# Patient Record
Sex: FEMALE | Race: WHITE | Employment: OTHER | URBAN - METROPOLITAN AREA
[De-identification: names, ages, dates, MRNs, and addresses within clinical notes are randomized per-mention and may not be internally consistent; named-entity substitution may affect disease eponyms.]

---

## 2017-01-01 ENCOUNTER — OFFICE VISIT (OUTPATIENT)
Dept: INTERNAL MEDICINE CLINIC | Age: 72
End: 2017-01-01

## 2017-01-01 ENCOUNTER — CARE COORDINATION (OUTPATIENT)
Dept: CARE COORDINATION | Age: 72
End: 2017-01-01

## 2017-01-01 ENCOUNTER — TELEPHONE (OUTPATIENT)
Dept: INTERNAL MEDICINE CLINIC | Age: 72
End: 2017-01-01

## 2017-01-01 VITALS — SYSTOLIC BLOOD PRESSURE: 110 MMHG | HEIGHT: 62 IN | DIASTOLIC BLOOD PRESSURE: 80 MMHG

## 2017-01-01 VITALS — DIASTOLIC BLOOD PRESSURE: 60 MMHG | OXYGEN SATURATION: 98 % | SYSTOLIC BLOOD PRESSURE: 128 MMHG | HEART RATE: 66 BPM

## 2017-01-01 VITALS — HEIGHT: 62 IN | SYSTOLIC BLOOD PRESSURE: 126 MMHG | TEMPERATURE: 99.3 F | DIASTOLIC BLOOD PRESSURE: 80 MMHG

## 2017-01-01 DIAGNOSIS — I50.22 CHRONIC SYSTOLIC CONGESTIVE HEART FAILURE (HCC): ICD-10-CM

## 2017-01-01 DIAGNOSIS — K22.719 BARRETT'S ESOPHAGUS WITH DYSPLASIA: ICD-10-CM

## 2017-01-01 DIAGNOSIS — E11.40 TYPE 2 DIABETES MELLITUS WITH DIABETIC NEUROPATHY, WITHOUT LONG-TERM CURRENT USE OF INSULIN (HCC): ICD-10-CM

## 2017-01-01 DIAGNOSIS — N18.6 ESRD (END STAGE RENAL DISEASE) (HCC): ICD-10-CM

## 2017-01-01 DIAGNOSIS — I63.9 CEREBROVASCULAR ACCIDENT (CVA), UNSPECIFIED MECHANISM (HCC): Primary | ICD-10-CM

## 2017-01-01 DIAGNOSIS — E11.40 TYPE 2 DIABETES MELLITUS WITH DIABETIC NEUROPATHY, WITHOUT LONG-TERM CURRENT USE OF INSULIN (HCC): Primary | ICD-10-CM

## 2017-01-01 DIAGNOSIS — N30.00 ACUTE CYSTITIS WITHOUT HEMATURIA: ICD-10-CM

## 2017-01-01 DIAGNOSIS — G62.9 NEUROPATHY: ICD-10-CM

## 2017-01-01 DIAGNOSIS — Z74.09 LIMITED MOBILITY: ICD-10-CM

## 2017-01-01 DIAGNOSIS — S42.294S OTHER CLOSED NONDISPLACED FRACTURE OF PROXIMAL END OF RIGHT HUMERUS, SEQUELA: ICD-10-CM

## 2017-01-01 DIAGNOSIS — I63.9 CEREBROVASCULAR ACCIDENT (CVA), UNSPECIFIED MECHANISM (HCC): ICD-10-CM

## 2017-01-01 DIAGNOSIS — B37.9 YEAST INFECTION: ICD-10-CM

## 2017-01-01 DIAGNOSIS — M19.90 OSTEOARTHRITIS, UNSPECIFIED OSTEOARTHRITIS TYPE, UNSPECIFIED SITE: ICD-10-CM

## 2017-01-01 DIAGNOSIS — I10 ESSENTIAL HYPERTENSION: ICD-10-CM

## 2017-01-01 DIAGNOSIS — Z09 HOSPITAL DISCHARGE FOLLOW-UP: Primary | ICD-10-CM

## 2017-01-01 DIAGNOSIS — M53.3 COCCYX PAIN: ICD-10-CM

## 2017-01-01 LAB
AVERAGE GLUCOSE: NORMAL
HBA1C MFR BLD: 5.8 %

## 2017-01-01 PROCEDURE — 99214 OFFICE O/P EST MOD 30 MIN: CPT | Performed by: NURSE PRACTITIONER

## 2017-01-01 PROCEDURE — G8599 NO ASA/ANTIPLAT THER USE RNG: HCPCS | Performed by: NURSE PRACTITIONER

## 2017-01-01 PROCEDURE — 1090F PRES/ABSN URINE INCON ASSESS: CPT | Performed by: NURSE PRACTITIONER

## 2017-01-01 PROCEDURE — G8427 DOCREV CUR MEDS BY ELIG CLIN: HCPCS | Performed by: INTERNAL MEDICINE

## 2017-01-01 PROCEDURE — 3046F HEMOGLOBIN A1C LEVEL >9.0%: CPT | Performed by: NURSE PRACTITIONER

## 2017-01-01 PROCEDURE — 1036F TOBACCO NON-USER: CPT | Performed by: NURSE PRACTITIONER

## 2017-01-01 PROCEDURE — 3017F COLORECTAL CA SCREEN DOC REV: CPT | Performed by: NURSE PRACTITIONER

## 2017-01-01 PROCEDURE — 3046F HEMOGLOBIN A1C LEVEL >9.0%: CPT | Performed by: INTERNAL MEDICINE

## 2017-01-01 PROCEDURE — G8427 DOCREV CUR MEDS BY ELIG CLIN: HCPCS | Performed by: NURSE PRACTITIONER

## 2017-01-01 PROCEDURE — 3014F SCREEN MAMMO DOC REV: CPT | Performed by: INTERNAL MEDICINE

## 2017-01-01 PROCEDURE — G8599 NO ASA/ANTIPLAT THER USE RNG: HCPCS | Performed by: INTERNAL MEDICINE

## 2017-01-01 PROCEDURE — G8400 PT W/DXA NO RESULTS DOC: HCPCS | Performed by: NURSE PRACTITIONER

## 2017-01-01 PROCEDURE — 1036F TOBACCO NON-USER: CPT | Performed by: INTERNAL MEDICINE

## 2017-01-01 PROCEDURE — G8417 CALC BMI ABV UP PARAM F/U: HCPCS | Performed by: NURSE PRACTITIONER

## 2017-01-01 PROCEDURE — 3017F COLORECTAL CA SCREEN DOC REV: CPT | Performed by: INTERNAL MEDICINE

## 2017-01-01 PROCEDURE — 4040F PNEUMOC VAC/ADMIN/RCVD: CPT | Performed by: NURSE PRACTITIONER

## 2017-01-01 PROCEDURE — G8484 FLU IMMUNIZE NO ADMIN: HCPCS | Performed by: INTERNAL MEDICINE

## 2017-01-01 PROCEDURE — G8400 PT W/DXA NO RESULTS DOC: HCPCS | Performed by: INTERNAL MEDICINE

## 2017-01-01 PROCEDURE — 4040F PNEUMOC VAC/ADMIN/RCVD: CPT | Performed by: INTERNAL MEDICINE

## 2017-01-01 PROCEDURE — 1123F ACP DISCUSS/DSCN MKR DOCD: CPT | Performed by: NURSE PRACTITIONER

## 2017-01-01 PROCEDURE — 1090F PRES/ABSN URINE INCON ASSESS: CPT | Performed by: INTERNAL MEDICINE

## 2017-01-01 PROCEDURE — 1123F ACP DISCUSS/DSCN MKR DOCD: CPT | Performed by: INTERNAL MEDICINE

## 2017-01-01 PROCEDURE — 99214 OFFICE O/P EST MOD 30 MIN: CPT | Performed by: INTERNAL MEDICINE

## 2017-01-01 PROCEDURE — 3014F SCREEN MAMMO DOC REV: CPT | Performed by: NURSE PRACTITIONER

## 2017-01-01 PROCEDURE — G8417 CALC BMI ABV UP PARAM F/U: HCPCS | Performed by: INTERNAL MEDICINE

## 2017-01-01 RX ORDER — ALBUTEROL SULFATE 90 UG/1
AEROSOL, METERED RESPIRATORY (INHALATION)
Qty: 3 INHALER | Refills: 3 | Status: SHIPPED | OUTPATIENT
Start: 2017-01-01

## 2017-01-01 RX ORDER — GABAPENTIN 300 MG/1
CAPSULE ORAL
Qty: 180 CAPSULE | Refills: 3 | Status: SHIPPED | OUTPATIENT
Start: 2017-01-01

## 2017-01-01 RX ORDER — CEPHALEXIN 500 MG/1
500 CAPSULE ORAL 3 TIMES DAILY
Qty: 21 CAPSULE | Refills: 0 | Status: ON HOLD | OUTPATIENT
Start: 2017-01-01 | End: 2018-01-01

## 2017-01-01 RX ORDER — SERTRALINE HYDROCHLORIDE 100 MG/1
TABLET, FILM COATED ORAL
Qty: 135 TABLET | Refills: 2 | Status: SHIPPED | OUTPATIENT
Start: 2017-01-01

## 2017-01-01 RX ORDER — GLIMEPIRIDE 1 MG/1
TABLET ORAL
Qty: 45 TABLET | Refills: 3 | Status: SHIPPED | OUTPATIENT
Start: 2017-01-01

## 2017-01-01 RX ORDER — CLOTRIMAZOLE 1 %
CREAM (GRAM) TOPICAL
Qty: 1 TUBE | Refills: 1 | Status: SHIPPED | OUTPATIENT
Start: 2017-01-01 | End: 2017-01-01

## 2017-01-01 RX ORDER — PANTOPRAZOLE SODIUM 40 MG/1
40 TABLET, DELAYED RELEASE ORAL DAILY
Qty: 90 TABLET | Refills: 1 | Status: SHIPPED | OUTPATIENT
Start: 2017-01-01

## 2017-01-01 RX ORDER — GABAPENTIN 300 MG/1
CAPSULE ORAL
Qty: 180 CAPSULE | Refills: 1 | Status: SHIPPED | OUTPATIENT
Start: 2017-01-01 | End: 2017-01-01 | Stop reason: SDUPTHER

## 2017-01-01 RX ORDER — PRAVASTATIN SODIUM 40 MG
TABLET ORAL
Qty: 90 TABLET | Refills: 1 | Status: SHIPPED | OUTPATIENT
Start: 2017-01-01 | End: 2017-01-01 | Stop reason: SDUPTHER

## 2017-01-01 RX ORDER — CEPHALEXIN 500 MG/1
500 CAPSULE ORAL 2 TIMES DAILY
Qty: 20 CAPSULE | Refills: 0 | Status: SHIPPED | OUTPATIENT
Start: 2017-01-01 | End: 2017-01-01

## 2017-01-01 RX ORDER — CUSHION
1 EACH MISCELLANEOUS DAILY
Qty: 1 EACH | Refills: 0 | Status: SHIPPED | OUTPATIENT
Start: 2017-01-01

## 2017-01-01 RX ORDER — CIPROFLOXACIN 500 MG/1
500 TABLET, FILM COATED ORAL 2 TIMES DAILY
Qty: 10 TABLET | Refills: 0 | Status: ON HOLD | OUTPATIENT
Start: 2017-01-01 | End: 2018-01-01 | Stop reason: ALTCHOICE

## 2017-01-01 RX ORDER — PRAVASTATIN SODIUM 40 MG
TABLET ORAL
Qty: 90 TABLET | Refills: 3 | Status: SHIPPED | OUTPATIENT
Start: 2017-01-01

## 2017-01-01 ASSESSMENT — ENCOUNTER SYMPTOMS
COUGH: 0
BLOOD IN STOOL: 0
CONSTIPATION: 0
COLOR CHANGE: 0
COLOR CHANGE: 0
BLOOD IN STOOL: 0
COUGH: 0
CONSTIPATION: 0
ABDOMINAL PAIN: 0
SHORTNESS OF BREATH: 0
ABDOMINAL PAIN: 0
SHORTNESS OF BREATH: 0

## 2017-01-03 ENCOUNTER — CARE COORDINATION (OUTPATIENT)
Dept: CARE COORDINATION | Age: 72
End: 2017-01-03

## 2017-01-06 ENCOUNTER — OFFICE VISIT (OUTPATIENT)
Dept: INTERNAL MEDICINE CLINIC | Age: 72
End: 2017-01-06

## 2017-01-06 VITALS — DIASTOLIC BLOOD PRESSURE: 50 MMHG | SYSTOLIC BLOOD PRESSURE: 100 MMHG | HEART RATE: 79 BPM

## 2017-01-06 DIAGNOSIS — D64.9 ANEMIA, UNSPECIFIED TYPE: ICD-10-CM

## 2017-01-06 DIAGNOSIS — I10 ESSENTIAL HYPERTENSION: ICD-10-CM

## 2017-01-06 DIAGNOSIS — I50.22 CHRONIC SYSTOLIC CONGESTIVE HEART FAILURE (HCC): ICD-10-CM

## 2017-01-06 DIAGNOSIS — E11.40 TYPE 2 DIABETES MELLITUS WITH DIABETIC NEUROPATHY, WITHOUT LONG-TERM CURRENT USE OF INSULIN (HCC): ICD-10-CM

## 2017-01-06 DIAGNOSIS — K92.2 GASTROINTESTINAL HEMORRHAGE, UNSPECIFIED GASTROINTESTINAL HEMORRHAGE TYPE: Primary | ICD-10-CM

## 2017-01-24 PROCEDURE — 99495 TRANSJ CARE MGMT MOD F2F 14D: CPT | Performed by: INTERNAL MEDICINE

## 2017-02-07 ENCOUNTER — HOSPITAL ENCOUNTER (OUTPATIENT)
Dept: NUCLEAR MEDICINE | Age: 72
Discharge: HOME | End: 2017-02-07
Attending: INTERNAL MEDICINE | Admitting: INTERNAL MEDICINE

## 2017-02-07 DIAGNOSIS — K92.2 HEMORRHAGE OF GASTROINTESTINAL TRACT, UNSPECIFIED: ICD-10-CM

## 2017-02-07 DIAGNOSIS — K92.2 GASTROINTESTINAL HEMORRHAGE: ICD-10-CM

## 2017-02-07 RX ADMIN — Medication 24.8 MILLICURIE: at 10:38

## 2017-05-25 PROBLEM — I63.9 CVA (CEREBRAL VASCULAR ACCIDENT) (HCC): Status: ACTIVE | Noted: 2017-01-01

## 2017-10-19 NOTE — PROGRESS NOTES
limbs, ambulates with a electric wheelchair, no slurred speech, no facial droop, good orientation. Good historian. Blood pressure 128/60, pulse 66, SpO2 98 %. Assessment:      1. Right leg strain/tendinitis: Acute  2. Right shoulder pain and discomfort: Chronic  3. DM 2  4. Hyperlipidemia  5. Osteoarthritis  6. End-stage renal disease on dialysis  7. Anemia      Plan:     1. Voltaren gel topical to right hamstring area may also be used about right shoulder  2. Patient declined orthopedic consultation  3. Gave slip to obtain fasting laboratory studies and call results  4. Return follow-up  Dr. Bob Quinteros

## 2017-11-08 NOTE — TELEPHONE ENCOUNTER
Barbara Scott calling [facility] [address] [facility] [facility] [facility] to advise Dandy Smith that the cushion for the wheel chair has not been approved as not being medically necessary. Is there documentation to the fact that she needs this cushion; I.e., ulcers, wound notes, pressure ulcers; anything that can get this cushion passed through the insurance. Please send electronically CVA with DX code     Fax # is [phone]    Khushi's phone # is:  [phone] - please advise if there will be any issues.

## 2017-11-14 NOTE — TELEPHONE ENCOUNTER
Refill request for GABAPENTIN, PRQAVASTATIN  medication.      Name of [facility] visit - 10/19/17     Pending visit - 1/25/18    Last refill -GABAPENTIN 6/19/17,  PRAVASTATIN 8/1/17      Medication Contract signed -  Last Oarrs ran-         Additional Comments

## 2017-12-07 NOTE — TELEPHONE ENCOUNTER
Refill request for PROAIR HFA INHALER medication.      Name of [facility] visit - 10/19/17     Pending visit - 1/25/18    Last refill -2/16/17      Medication Contract signed -   Last Oarrs ran-         Additional Comments

## 2017-12-29 NOTE — TELEPHONE ENCOUNTER
She has no way to get a urine sample taken to anywhere, and she cant pee that much anyway, she burns really bad when she does go and would like keflex sent to her pharmacy.

## 2017-12-29 NOTE — TELEPHONE ENCOUNTER
Patient said Dr. Han Pickering gave her Cipro for a UTI, ever since then she has had nausea, vomiting, chills and extreme burning when she urinates worse than before. Patient denies fever.           [phone]

## 2018-01-01 ENCOUNTER — TELEPHONE (OUTPATIENT)
Dept: INTERNAL MEDICINE CLINIC | Age: 73
End: 2018-01-01

## 2018-01-01 ENCOUNTER — HOSPITAL ENCOUNTER (OUTPATIENT)
Dept: GENERAL RADIOLOGY | Age: 73
Discharge: HOME | End: 2018-01-23
Admitting: INTERNAL MEDICINE

## 2018-01-01 ENCOUNTER — HOSPITAL ENCOUNTER (OUTPATIENT)
Dept: GENERAL RADIOLOGY | Age: 73
Discharge: HOME | End: 2018-02-09
Attending: INTERNAL MEDICINE | Admitting: INTERNAL MEDICINE

## 2018-01-01 DIAGNOSIS — R13.10 DYSPHAGIA, UNSPECIFIED TYPE: ICD-10-CM

## 2018-01-01 DIAGNOSIS — R13.10 DYSPHAGIA: ICD-10-CM

## 2018-01-23 NOTE — PROCEDURES
INSTRUMENTAL SWALLOW REPORT  MODIFIED BARIUM SWALLOW    NAME: Kamala Dillon   : 1945  MRN: [id number]       Date of Eval: 2018   Radiologist: Dr. Eleni Munoz  Ordering Physician: Dr. Celso Weller    Referring Diagnosis(es): Dysphagia     Past Medical History:  has a past medical history of Anxiety; Arthritis; Rae esophagus; CHF (congestive heart failure) ([facility] [address] Chronic back pain; Depression; Diabetes mellitus ([facility] [address] ESRD (end stage renal disease) ([facility] [address] GERD (gastroesophageal reflux disease); GI bleed; Hemodialysis patient [facility]); History of blood transfusion; Hyperlipidemia; Hypertension; Neuropathy ([facility] [address] Seizures ([facility] [address] Squamous cell carcinoma; Type II or unspecified type diabetes mellitus without mention of complication, not stated as uncontrolled; and Unspecified cerebral artery occlusion with cerebral infarction. Past Surgical History:  has a past surgical history that includes Appendectomy; Hysterectomy; Tonsillectomy; AV fistula repair;  section; Carpal tunnel release; eye surgery (LASER); toenail excision; Hand surgery (Bilateral, 2016); other surgical history (Right, 2016); Upper gastrointestinal endoscopy (); Upper gastrointestinal endoscopy (11/10/2016); Upper gastrointestinal endoscopy (2016); Colonoscopy (); Colonoscopy (2016); and Colonoscopy (2016). Recent CXR/CT of Chest: 17  LUNGS: No well-defined focal opacity. Basilar changes are seen similar to the prior,    differential atelectasis versus less likely airspace disease. Patient Complaints/Reason for Referral:  Kamala Dillon was referred for a MBS to assess the efficiency of his/her swallow function, assess for aspiration, and to make recommendations regarding safe dietary consistencies, effective compensatory strategies, and safe eating environment.   Patient reports the following: \"Food usually get stuck and I vomit it back up; however, pt stating last night I had my first meal that I did not have any difficulties with\". Pt denies pain when swallowing. Order: \"ST to request MBS to assess swallow fucntion due to patient's complaints of pain when swallowing and the reporting coughing on ice chips\". Impressions:  Treatment Dx and ICD 10: Dysphagia   Dysphagia Outcome Severity Scale: Level 4: Mild moderate dysphagia- Intermittent supervision/cueing. One - two diet consistencies restricted  Penetration-Aspiration Scale (PAS): 5 - Material enters the airway, contacts the vocal folds, and is not ejected into the airway    Patient demonstrates mild -moderate pharyngeal dysphagia and suspected esophageal dysphagia   · Oral phase of swallow WFL  · Pharyngeal phase deficits are characterized by reduced laryngeal elevation & excursion resulting in reduced laryngeal vestibule closure and deep silent penetration to the VFs with thin liquids via cup and tsp in the head neutral and chin tuck position. With implementation of the supraglottic swallow and small sips (breath hold + effortful swallow), penetration was eliminated. Flash penetration with nectar thick liquids via edge of cup w/o strategies on 1/3 trials. No penetration or significant residue with puree, mechanical soft, or regular consistencies. · A/P view: Esophageal retention noted with mild retrograde flow at the mid esophageus. If pt with continued globus and reflux complaints, GI follow up is warranted. Alternating liquids/solids facilitated esophageal peristalsis. Consider medications crushed in puree d/t esophageal retention. · Due to the above findings, ST recommending pt for a Regular, nectar thick liquid diet. Pt able to recall strategies for safe consumption of thin liquids with min cues after the study. Pt will likely need reinforcement and education on proper use of supraglottic swallow. Thin liquids, small sips w/ use of strategy with ST only recommended until pt consistently uses strategies independently.  Pt would benefit from the following exercises: effortful swallow & Mendelson. Recommended Diet:  Solid consistency: Regular  Liquid consistency: Nectar, Thin  Liquid administration via: Cup  Medication administration: Meds in puree    Safe Swallow Protocol:  Supervision: Distant  Compensatory Swallowing Strategies: Alternate solids and liquids, Eat/Feed slowly, No straws, Upright as possible for all oral intake, Remain upright for 30-45 minutes after meals, Small bites/sips    Recommendations/Treatment  Requires SLP Intervention: Yes  Postural Changes and/or Swallow Maneuvers: Supraglottic swallow    Recommended Exercises:    Therapeutic Interventions: Diet tolerance monitoring, Patient/Family education, Pharyngeal exercises, Therapeutic PO trials with SLP  Referral To: GI as appropriate     Education: Images and recommendations were reviewed with the patient following this exam. Patient provided handout with recommendations and strategies. Patient Education Response: Verbalizes understanding, Demonstrated understanding, Needs reinforcement    Prognosis  Prognosis for safe diet advancement: good  Barriers to reach goals: age  Duration/Frequency of Treatment: At the discretion of treating SLP    Goals:    Long Term:   Goal 1: Patient will tolerate least restrictive oral diet w/o s/s of penetration-aspiration, while maintaing adequate oral nutrition   Short Term:  STG 1: The patient will tolerate recommended diet without observed clinical signs of aspiration  STG 2: The patient/caregiver will demonstrate understanding of compensatory strategies for improved swallowing safety  STG 3: The patient will tolerate thin liquids without signs and symptoms of aspiration 10/10 via cup with independent use of small sip and supraglottic strategies.    STG 4: The patient will complete strengthening exercises targeting laryngeal elevation and excursion     Oral Preparation / Oral Phase  Oral Phase: WFL    Pharyngeal Phase  Pharyngeal Phase: Impaired  Pharyngeal Phase - Major Contributing Deficits  Premature Spillage to Valleculae: Nectar cup, Thin cup  Pooling Pyriform: Thin cup  Reduced Airway/laryngeal Closure: Thin cup, Nectar cup  Shallow Penetration During: Nectar cup  Deep Penetration During: Thin cup, Thin teaspoon  No Retrieval (deep): Thin teaspoon, Thin cup  No Cough Reflex: Thin cup, Thin teaspoon    Esophageal Phase  Esophageal Screen: Impaired  Upper Esophageal Screen- Major Contributing Deficits  Decreased Esophageal Peristalsis: All    Pain   Patient Currently in Pain: Denies    G-Code:  SLP G-Codes  Functional Limitations: Swallowing  Swallow Current Status (): At least 20 percent but less than 40 percent impaired, limited or restricted  Swallow Goal Status ():  At least 1 percent but less than 20 percent impaired, limited or restricted      Therapy Time:   Individual Concurrent Group Co-treatment   Time In 0815         Time Out 0845         Minutes 30           Suzi [employer]  Speech-Language Pathology

## 2018-02-02 NOTE — TELEPHONE ENCOUNTER
This was never received by [facility] [employer]  And they need something that states that she has one sided weakness. .. Please send again new comparable dx code.      Any questions, call Martín Lopez at 1- [phone]

## 2018-02-07 PROBLEM — G81.00 FLACCID HEMIPLEGIA (HCC): Status: ACTIVE | Noted: 2018-01-01

## 2018-02-13 NOTE — TELEPHONE ENCOUNTER
Call patient: I have no authority to transfer her out of the nursing home. She needs to talk to the physician at the nursing home is attending to her.   Dr. jacinto

## 2018-03-26 PROBLEM — A41.9 SEPSIS (HCC): Status: ACTIVE | Noted: 2018-01-01
